# Patient Record
Sex: MALE | Race: WHITE | URBAN - METROPOLITAN AREA
[De-identification: names, ages, dates, MRNs, and addresses within clinical notes are randomized per-mention and may not be internally consistent; named-entity substitution may affect disease eponyms.]

---

## 2017-05-19 NOTE — NUR
[facility]. ETA 20-30 MINS.

## 2017-06-25 ENCOUNTER — EMERGENCY (EMERGENCY)
Facility: HOSPITAL | Age: 42
LOS: 1 days | Discharge: HOME | End: 2017-06-25
Admitting: EMERGENCY MEDICINE
Payer: SELF-PAY

## 2017-06-25 VITALS
HEART RATE: 94 BPM | TEMPERATURE: 99 F | OXYGEN SATURATION: 99 % | WEIGHT: 283.73 LBS | DIASTOLIC BLOOD PRESSURE: 81 MMHG | RESPIRATION RATE: 20 BRPM | SYSTOLIC BLOOD PRESSURE: 128 MMHG

## 2017-06-25 DIAGNOSIS — R44.0 AUDITORY HALLUCINATIONS: ICD-10-CM

## 2017-06-25 DIAGNOSIS — F20.9 SCHIZOPHRENIA, UNSPECIFIED: ICD-10-CM

## 2017-06-25 PROCEDURE — 99283 EMERGENCY DEPT VISIT LOW MDM: CPT

## 2017-06-25 RX ORDER — OLANZAPINE 15 MG/1
10 TABLET, FILM COATED ORAL ONCE
Qty: 0 | Refills: 0 | Status: COMPLETED | OUTPATIENT
Start: 2017-06-25 | End: 2017-06-25

## 2017-06-25 RX ADMIN — OLANZAPINE 10 MILLIGRAM(S): 15 TABLET, FILM COATED ORAL at 14:19

## 2017-06-25 NOTE — ED ADULT NURSE NOTE - CHPI ED SYMPTOMS NEG
no paranoia/no weight loss/no confusion/no homicidal/no disorientation/no suicidal/no agitation/no change in level of consciousness/no weakness

## 2017-06-25 NOTE — ED ADULT NURSE NOTE - CHIEF COMPLAINT QUOTE
Pt complaining of auditory hallucinations. Pt states he has been hearing voices for a while now. Pt last dose of psych meds were yesterday. Pt was admitted for same complaint one month ago at [facility]. Pt states voices are swearing at him.

## 2017-06-25 NOTE — ED ADULT TRIAGE NOTE - CHIEF COMPLAINT QUOTE
Pt complaining of auditory hallucinations. Pt states he has been hearing voices for a while now. Pt last dose of psych meds were yesterday. Pt was admitted for same complaint one month at [facility] Pt complaining of auditory hallucinations. Pt states he has been hearing voices for a while now. Pt last dose of psych meds were yesterday. Pt was admitted for same complaint one month ago at [facility]. Pt states voices are swearing at him.

## 2017-06-25 NOTE — ED PROVIDER NOTE - PROGRESS NOTE DETAILS
The scribe's documentation has been prepared under my direction and personally reviewed by me in its entirety. I confirm that the note above accurately reflects all work, treatment, procedures, and medical decision making performed by me.  JAMIE Hernandez Pt is feeling better and is asking to leave. Denies any SI or HI. Advising outpatient mental health F/U and will provide Pt with resources.

## 2017-06-25 NOTE — ED PROVIDER NOTE - MEDICAL DECISION MAKING DETAILS
Dehydration v muscle spasm v anxiety. Denies CP and SOB. Will obtain basic labs, EKG, give IV fluids, check CK, and reassess. pt reports feels better, given PO zyprexa. out pt f/u.  no risk  for si/hi at this time. well appearing

## 2017-06-25 NOTE — ED PROVIDER NOTE - OBJECTIVE STATEMENT
41y M Pt with PMHx of depression and schizophrenia, takes Atarax 50mg daily, Wellbutrin, Seroquel 600mg once daily, and Klonopin 1mg, presents to ED with auditory hallucinations and panic attacks. Pt states he ran out of his medications 1 month ago and has not followed up with any doctor. Was admitted to psych 1 month ago and was at [facility] yesterday for the same symptoms, not given or Rxed any medications. At times SI but not currently. 41y M Pt with PMHx of depression and schizophrenia, takes Atarax 50mg daily, Wellbutrin, Seroquel 600mg once daily, and Klonopin 1mg, presents to ED with auditory hallucinations and anxiety. Pt states he ran out of his medications 1 month ago and has not followed up with a doctor. Was admitted to psych 1 month ago and was at [facility] yesterday for the same symptoms as today, not given or Rxed any medications. At times SI but not currently.

## 2018-12-10 ENCOUNTER — HOSPITAL ENCOUNTER (EMERGENCY)
Dept: HOSPITAL 12 - ER | Age: 43
LOS: 1 days | Discharge: ANOTHER HOSPITAL | End: 2018-12-11
Payer: COMMERCIAL

## 2018-12-10 VITALS — HEIGHT: 71 IN | BODY MASS INDEX: 34.3 KG/M2 | WEIGHT: 245 LBS

## 2018-12-10 DIAGNOSIS — F17.290: ICD-10-CM

## 2018-12-10 DIAGNOSIS — R45.851: Primary | ICD-10-CM

## 2018-12-10 DIAGNOSIS — F20.9: ICD-10-CM

## 2018-12-10 LAB
ALP SERPL-CCNC: 56 U/L (ref 50–136)
ALT SERPL W/O P-5'-P-CCNC: 21 U/L (ref 16–63)
AMPHETAMINES UR QL SCN>1000 NG/ML: NEGATIVE
AST SERPL-CCNC: 18 U/L (ref 15–37)
BARBITURATES UR QL SCN: NEGATIVE
BASOPHILS # BLD AUTO: 0.1 K/UL (ref 0–8)
BASOPHILS NFR BLD AUTO: 0.6 % (ref 0–2)
BILIRUB DIRECT SERPL-MCNC: 0.1 MG/DL (ref 0–0.2)
BILIRUB SERPL-MCNC: 0.4 MG/DL (ref 0.2–1)
BUN SERPL-MCNC: 11 MG/DL (ref 7–18)
CHLORIDE SERPL-SCNC: 105 MMOL/L (ref 98–107)
CO2 SERPL-SCNC: 27 MMOL/L (ref 21–32)
COCAINE UR QL SCN: NEGATIVE
CREAT SERPL-MCNC: 0.8 MG/DL (ref 0.6–1.3)
EOSINOPHIL # BLD AUTO: 0.3 K/UL (ref 0–0.7)
EOSINOPHIL NFR BLD AUTO: 2.5 % (ref 0–7)
ETHANOL SERPL-MCNC: < 3 MG/DL (ref 0–0)
GLUCOSE SERPL-MCNC: 104 MG/DL (ref 74–106)
HCT VFR BLD AUTO: 40.5 % (ref 36.7–47.1)
HGB BLD-MCNC: 13.7 G/DL (ref 12.5–16.3)
LYMPHOCYTES # BLD AUTO: 1.8 K/UL (ref 20–40)
LYMPHOCYTES NFR BLD AUTO: 17.9 % (ref 20.5–51.5)
MCH RBC QN AUTO: 30.6 UUG (ref 23.8–33.4)
MCHC RBC AUTO-ENTMCNC: 34 G/DL (ref 32.5–36.3)
MCV RBC AUTO: 90.7 FL (ref 73–96.2)
MONOCYTES # BLD AUTO: 0.8 K/UL (ref 2–10)
MONOCYTES NFR BLD AUTO: 7.4 % (ref 0–11)
NEUTROPHILS # BLD AUTO: 7.3 K/UL (ref 1.8–8.9)
NEUTROPHILS NFR BLD AUTO: 71.6 % (ref 38.5–71.5)
OPIATES UR QL SCN: NEGATIVE
PCP UR QL SCN>25 NG/ML: NEGATIVE
PLATELET # BLD AUTO: 163 K/UL (ref 152–348)
POTASSIUM SERPL-SCNC: 3.5 MMOL/L (ref 3.5–5.1)
RBC # BLD AUTO: 4.47 MIL/UL (ref 4.06–5.63)
THC UR QL SCN>50 NG/ML: NEGATIVE
WBC # BLD AUTO: 10.2 K/UL (ref 3.6–10.2)
WS STN SPEC: 7 G/DL (ref 6.4–8.2)

## 2018-12-10 PROCEDURE — 85025 COMPLETE CBC W/AUTO DIFF WBC: CPT

## 2018-12-10 PROCEDURE — 99406 BEHAV CHNG SMOKING 3-10 MIN: CPT

## 2018-12-10 PROCEDURE — 36415 COLL VENOUS BLD VENIPUNCTURE: CPT

## 2018-12-10 PROCEDURE — 80048 BASIC METABOLIC PNL TOTAL CA: CPT

## 2018-12-10 PROCEDURE — 99285 EMERGENCY DEPT VISIT HI MDM: CPT

## 2018-12-10 PROCEDURE — G0480 DRUG TEST DEF 1-7 CLASSES: HCPCS

## 2018-12-10 PROCEDURE — 81001 URINALYSIS AUTO W/SCOPE: CPT

## 2018-12-10 PROCEDURE — A4663 DIALYSIS BLOOD PRESSURE CUFF: HCPCS

## 2018-12-10 PROCEDURE — 80307 DRUG TEST PRSMV CHEM ANLYZR: CPT

## 2018-12-10 PROCEDURE — 80076 HEPATIC FUNCTION PANEL: CPT

## 2018-12-10 NOTE — NUR
RECEIVED SHIFT REPORT FROM MAIRZOL DWYER. 



RECEIVED PT SLEEPING IN BED. 1:1 SITTER (YAJAIRA LINDSAY LVN) AT BEDSIDE FOR SI 
PRECAUTION.

## 2018-12-10 NOTE — NUR
Patient is resting comfortably on gurney with eyes closed, still for urine 
sample.  Patient was reminded to give urine sample.  'I don't feel like going." 
per patient's verbalization.

## 2018-12-10 NOTE — NUR
Patient is alert, requesting for juice and sandwich immediately from the ER 
staff, respiration is easy, poor hygiene noted with strong body scent/odor, 
calm & cooperative@the moment, pending MD evaluation

## 2018-12-10 NOTE — NUR
PT REFUSING V/S CHECK AT THIS TIME. PT REMAINS IN BED, DOES NOT APPEAR TO BE IN 
ANY APPARENT DISTRESS.

## 2018-12-11 LAB
APPEARANCE UR: CLEAR
BILIRUB UR QL STRIP: NEGATIVE
COLOR UR: YELLOW
DEPRECATED SQUAMOUS URNS QL MICRO: (no result) /HPF
GLUCOSE UR STRIP-MCNC: NEGATIVE MG/DL
HGB UR QL STRIP: NEGATIVE
KETONES UR STRIP-MCNC: NEGATIVE MG/DL
LEUKOCYTE ESTERASE UR QL STRIP: NEGATIVE
MUCOUS THREADS URNS QL MICRO: (no result) /LPF
NITRITE UR QL STRIP: NEGATIVE
PH UR STRIP: 6.5 [PH] (ref 5–8)
PROT UR QL STRIP: NEGATIVE
RBC #/AREA URNS HPF: (no result) /HPF (ref 0–3)
SP GR UR STRIP: 1.02 (ref 1–1.03)
UROBILINOGEN UR STRIP-MCNC: 1 E.U./DL
WBC #/AREA URNS HPF: (no result) /HPF
WBC #/AREA URNS HPF: (no result) /HPF (ref 0–3)

## 2018-12-11 NOTE — NUR
SPOKE W/ SHAHID FROM [facility] [place] INTAKE RE PT'S 
ADMISSION. ALL NECESSARY DOCUMENTS HAVE BEEN FAXED OVER. AWAITING TO HEAR BACK.

## 2018-12-11 NOTE — NUR
Ron Llanes from [facility] called to inform that Patient will be 
accepted. Accepting MD is DR Lozoya. Called for report # [phone]

## 2018-12-11 NOTE — NUR
GAVE ADMITTING REPORT TO ELSA RODRIGUEZ, AT [facility].

## 2018-12-11 NOTE — NUR
PT WILL BE TRANSFERRED TO [facility] BY EMT'S 
FROM [facility].

## 2018-12-11 NOTE — NUR
Called Kirti to transport patient to [facility]. ETA 
30mins. Trip # [id number]

## 2018-12-11 NOTE — NUR
Patient Tranfers to outside Facility



Physician: DR. KC



Location: [facility].

## 2019-01-19 ENCOUNTER — HOSPITAL ENCOUNTER (EMERGENCY)
Dept: HOSPITAL 87 - ER | Age: 44
Discharge: PSYCHIATRIC OR SUBSTANCE USE FACILITY | End: 2019-01-19
Payer: MEDICAID

## 2019-01-19 VITALS — HEIGHT: 67 IN | BODY MASS INDEX: 48.1 KG/M2 | WEIGHT: 306.44 LBS

## 2019-01-19 VITALS — SYSTOLIC BLOOD PRESSURE: 130 MMHG | DIASTOLIC BLOOD PRESSURE: 80 MMHG

## 2019-01-19 DIAGNOSIS — F20.9: ICD-10-CM

## 2019-01-19 DIAGNOSIS — Z91.14: ICD-10-CM

## 2019-01-19 DIAGNOSIS — R45.851: Primary | ICD-10-CM

## 2019-01-19 LAB
AMPHETAMINES UR QL SCN: (no result)
APPEARANCE UR: CLEAR
BARBITURATES UR QL SCN: NEGATIVE
BASOPHILS NFR BLD AUTO: 0.5 % (ref 0–2)
BENZODIAZ UR QL SCN: NEGATIVE
BZE UR QL SCN: NEGATIVE
CANNABINOIDS UR QL SCN: NEGATIVE
CHLORIDE SERPL-SCNC: 108 MEQ/L (ref 98–107)
COLOR UR: YELLOW
EOSINOPHIL NFR BLD AUTO: 2.8 % (ref 0–5)
ERYTHROCYTE [DISTWIDTH] IN BLOOD BY AUTOMATED COUNT: 14 % (ref 11.6–14.6)
ETHANOL SERPL-MCNC: < 10 MG/DL
HCT VFR BLD AUTO: 40.3 % (ref 42–52)
HGB BLD-MCNC: 13.5 G/DL (ref 14–18)
HGB UR QL STRIP: NEGATIVE
KETONES UR STRIP-MCNC: (no result) MG/DL
LEUKOCYTE ESTERASE UR QL STRIP: NEGATIVE
LYMPHOCYTES NFR BLD AUTO: 20.2 % (ref 20–50)
MCH RBC QN AUTO: 30.4 PG (ref 28–32)
MCV RBC AUTO: 91 FL (ref 80–94)
METHADONE UR QL SCN: NEGATIVE
MONOCYTES NFR BLD AUTO: 7 % (ref 2–8)
NEUTROPHILS NFR BLD AUTO: 69.5 % (ref 40–76)
NITRITE UR QL STRIP: NEGATIVE
OPIATES UR QL SCN: NEGATIVE
PCP UR QL SCN: NEGATIVE
PH UR STRIP: 5.5 [PH] (ref 4.5–8)
PLATELET # BLD AUTO: 177 X1000/UL (ref 130–400)
PMV BLD AUTO: 9.2 FL (ref 7.4–10.4)
PROT UR QL STRIP: NEGATIVE
RBC # BLD AUTO: 4.43 MILL/UL (ref 4.7–6.1)
SP GR UR STRIP: 1.02 (ref 1–1.03)
UROBILINOGEN UR STRIP-MCNC: 0.2 E.U./DL (ref 0.2–1)

## 2019-01-19 PROCEDURE — 36415 COLL VENOUS BLD VENIPUNCTURE: CPT

## 2019-01-19 PROCEDURE — 81003 URINALYSIS AUTO W/O SCOPE: CPT

## 2019-01-19 PROCEDURE — 80305 DRUG TEST PRSMV DIR OPT OBS: CPT

## 2019-01-19 PROCEDURE — 99285 EMERGENCY DEPT VISIT HI MDM: CPT

## 2019-01-19 PROCEDURE — 85025 COMPLETE CBC W/AUTO DIFF WBC: CPT

## 2019-01-19 PROCEDURE — 80053 COMPREHEN METABOLIC PANEL: CPT

## 2020-01-16 ENCOUNTER — HOSPITAL ENCOUNTER (EMERGENCY)
Dept: HOSPITAL 4 - SED | Age: 45
Discharge: HOME | End: 2020-01-16
Payer: COMMERCIAL

## 2020-01-16 VITALS — HEIGHT: 72 IN | WEIGHT: 260 LBS | BODY MASS INDEX: 35.21 KG/M2

## 2020-01-16 VITALS — SYSTOLIC BLOOD PRESSURE: 160 MMHG

## 2020-01-16 VITALS — SYSTOLIC BLOOD PRESSURE: 136 MMHG

## 2020-01-16 DIAGNOSIS — F17.290: ICD-10-CM

## 2020-01-16 DIAGNOSIS — R45.851: Primary | ICD-10-CM

## 2020-01-16 DIAGNOSIS — F20.9: ICD-10-CM

## 2020-01-16 LAB
ALBUMIN SERPL BCP-MCNC: 3.1 G/DL (ref 3.4–4.8)
ALT SERPL W P-5'-P-CCNC: 21 U/L (ref 12–78)
AMPHETAMINES UR QL SCN: NEGATIVE
ANION GAP SERPL CALCULATED.3IONS-SCNC: 7 MMOL/L (ref 5–15)
APAP SERPL-MCNC: < 1 UG/ML (ref 1–30)
APPEARANCE UR: (no result)
AST SERPL W P-5'-P-CCNC: 20 U/L (ref 10–37)
BACTERIA URNS QL MICRO: (no result) /HPF
BARBITURATES UR QL SCN: NEGATIVE
BASOPHILS # BLD AUTO: 0.1 K/UL (ref 0–0.2)
BASOPHILS NFR BLD AUTO: 0.7 % (ref 0–2)
BENZODIAZ UR QL SCN: NEGATIVE
BILIRUB SERPL-MCNC: 0.2 MG/DL (ref 0–1)
BILIRUB UR QL STRIP: NEGATIVE
BUN SERPL-MCNC: 13 MG/DL (ref 8–21)
BZE UR QL SCN: NEGATIVE
CALCIUM SERPL-MCNC: 8.1 MG/DL (ref 8.4–11)
CANNABINOIDS UR QL SCN: NEGATIVE
CHLORIDE SERPL-SCNC: 103 MMOL/L (ref 98–107)
COLOR UR: YELLOW
CREAT SERPL-MCNC: 0.75 MG/DL (ref 0.55–1.3)
EOSINOPHIL # BLD AUTO: 0.4 K/UL (ref 0–0.4)
EOSINOPHIL NFR BLD AUTO: 5 % (ref 0–4)
ERYTHROCYTE [DISTWIDTH] IN BLOOD BY AUTOMATED COUNT: 15.6 % (ref 9–15)
ETHANOL SERPL-MCNC: < 3 MG/DL (ref ?–10)
GFR SERPL CREATININE-BSD FRML MDRD: 144 ML/MIN (ref 90–?)
GLUCOSE SERPL-MCNC: 123 MG/DL (ref 70–99)
GLUCOSE UR STRIP-MCNC: NEGATIVE MG/DL
HCT VFR BLD AUTO: 37.9 % (ref 36–54)
HGB BLD-MCNC: 12.5 G/DL (ref 14–18)
HGB UR QL STRIP: NEGATIVE
KETONES UR STRIP-MCNC: NEGATIVE MG/DL
LEUKOCYTE ESTERASE UR QL STRIP: NEGATIVE
LYMPHOCYTES # BLD AUTO: 1.7 K/UL (ref 1–5.5)
LYMPHOCYTES NFR BLD AUTO: 18.9 % (ref 20.5–51.5)
MCH RBC QN AUTO: 29 PG (ref 27–31)
MCHC RBC AUTO-ENTMCNC: 33 % (ref 32–36)
MCV RBC AUTO: 86 FL (ref 79–98)
METHADONE UR-SCNC: NEGATIVE UMOL/L
METHAMPHET UR-SCNC: NEGATIVE UMOL/L
MONOCYTES # BLD MANUAL: 0.5 K/UL (ref 0–1)
MONOCYTES # BLD MANUAL: 5.9 % (ref 1.7–9.3)
NEUTROPHILS # BLD AUTO: 6.2 K/UL (ref 1.8–7.7)
NEUTROPHILS NFR BLD AUTO: 69.5 % (ref 40–70)
NITRITE UR QL STRIP: NEGATIVE
OPIATES UR QL SCN: NEGATIVE
OXYCODONE SERPL-MCNC: NEGATIVE NG/ML
PCP UR QL SCN: NEGATIVE
PH UR STRIP: 6 [PH] (ref 5–8)
PLATELET # BLD AUTO: 131 K/UL (ref 130–430)
POTASSIUM SERPL-SCNC: 3.4 MMOL/L (ref 3.5–5.1)
PROT UR QL STRIP: (no result)
RBC # BLD AUTO: 4.38 MIL/UL (ref 4.2–6.2)
RBC #/AREA URNS HPF: (no result) /HPF (ref 0–3)
SODIUM SERPLBLD-SCNC: 137 MMOL/L (ref 136–145)
SP GR UR STRIP: 1.02 (ref 1–1.03)
TRICYCLICS UR-MCNC: NEGATIVE NG/ML
URINE PROPOXYPHENE SCREEN: NEGATIVE
UROBILINOGEN UR STRIP-MCNC: 0.2 MG/DL (ref 0.2–1)
WBC # BLD AUTO: 9 K/UL (ref 4.8–10.8)
WBC #/AREA URNS HPF: (no result) /HPF (ref 0–3)

## 2020-01-16 PROCEDURE — 80053 COMPREHEN METABOLIC PANEL: CPT

## 2020-01-16 PROCEDURE — G0480 DRUG TEST DEF 1-7 CLASSES: HCPCS

## 2020-01-16 PROCEDURE — 99284 EMERGENCY DEPT VISIT MOD MDM: CPT

## 2020-01-16 PROCEDURE — 36415 COLL VENOUS BLD VENIPUNCTURE: CPT

## 2020-01-16 PROCEDURE — 81000 URINALYSIS NONAUTO W/SCOPE: CPT

## 2020-01-16 PROCEDURE — G0482 DRUG TEST DEF 15-21 CLASSES: HCPCS

## 2020-01-16 PROCEDURE — 85025 COMPLETE CBC W/AUTO DIFF WBC: CPT

## 2020-01-16 PROCEDURE — 80307 DRUG TEST PRSMV CHEM ANLYZR: CPT

## 2020-01-16 NOTE — NUR
Pt in bed continuining to sleep. No s/s of distress noted. Requested another 
blanket. Will continue to monitor.

## 2020-01-16 NOTE — NUR
Transportation arranged by house sup. Pt was provided w/ a meal and home less 
packet. Pt has adequate clothing.

## 2020-01-16 NOTE — NUR
Spoke w/ Freddy from [facility] regarding pyschiatrist follow up report. Made 
aware of situation, and report will be faxed over now. 2nd request for 
Tele[facility] has been cancelled.

## 2020-01-16 NOTE — NUR
Pt BIBA w/ statements of wanting to kill himself. Pt is alert, but confused. Pt 
refusing to answer questions. Unccoperative w/ assessment. Pt has Hx of 
schizophrenia noted. Will continue to monitor.

## 2020-01-16 NOTE — NUR
Dr. Salazar spoke to Pt via [facility]. Evaluation done. Pt able to answer 
questions asked. Reccomendations noted, will report to ER MD. Pt in bed, 
stable, able to make needs known.

## 2020-01-16 NOTE — NUR
Request for Tele[facility]. Awaiting time is approximated up to 2HRs. Will continue 
to monitor. Pt in bed asleep. No s/s of distress noted.

## 2020-01-16 NOTE — NUR
Patient given written and verbal discharge instructions and verbalizes 
understanding.  ER MD discussed with patient the results and treatment 
provided. Patient in stable condition. ID arm band removed. 

Rx of zyprexa given. Patient educated on pain management and to follow up with 
PMD. Pain Scale 0/10.

Opportunity for questions provided and answered. Medication side effect fact 
sheet provided.

## 2020-11-07 ENCOUNTER — HOSPITAL ENCOUNTER (EMERGENCY)
Dept: HOSPITAL 87 - ER | Age: 45
Discharge: HOME | End: 2020-11-07
Payer: MEDICAID

## 2020-11-07 VITALS — WEIGHT: 315 LBS | BODY MASS INDEX: 45.1 KG/M2 | HEIGHT: 70 IN

## 2020-11-07 VITALS — DIASTOLIC BLOOD PRESSURE: 80 MMHG | SYSTOLIC BLOOD PRESSURE: 148 MMHG

## 2020-11-07 DIAGNOSIS — Z59.0: ICD-10-CM

## 2020-11-07 DIAGNOSIS — F20.9: ICD-10-CM

## 2020-11-07 DIAGNOSIS — R03.0: ICD-10-CM
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
## 2020-11-07 DIAGNOSIS — T73.0XXA: Primary | ICD-10-CM

## 2020-11-07 PROCEDURE — 99283 EMERGENCY DEPT VISIT LOW MDM: CPT

## 2020-11-07 PROCEDURE — 93005 ELECTROCARDIOGRAM TRACING: CPT

## 2020-11-07 SDOH — ECONOMIC STABILITY - HOUSING INSECURITY: HOMELESSNESS: Z59.0

## 2021-03-12 ENCOUNTER — HOSPITAL ENCOUNTER (EMERGENCY)
Dept: HOSPITAL 54 - ER | Age: 46
Discharge: PSYCHIATRIC OR SUBSTANCE USE FACILITY | End: 2021-03-12
Payer: MEDICAID

## 2021-03-12 VITALS — DIASTOLIC BLOOD PRESSURE: 83 MMHG | SYSTOLIC BLOOD PRESSURE: 138 MMHG

## 2021-03-12 VITALS — WEIGHT: 315 LBS | BODY MASS INDEX: 44.1 KG/M2 | HEIGHT: 71 IN

## 2021-03-12 DIAGNOSIS — Z59.0: ICD-10-CM

## 2021-03-12 DIAGNOSIS — R45.851: Primary | ICD-10-CM

## 2021-03-12 DIAGNOSIS — F32.9: ICD-10-CM

## 2021-03-12 DIAGNOSIS — I10: ICD-10-CM

## 2021-03-12 DIAGNOSIS — F20.9: ICD-10-CM

## 2021-03-12 LAB
ALBUMIN SERPL BCP-MCNC: 3.2 G/DL (ref 3.4–5)
ALP SERPL-CCNC: 69 U/L (ref 46–116)
ALT SERPL W P-5'-P-CCNC: 17 U/L (ref 12–78)
APAP SERPL-MCNC: 0 UG/ML (ref 10–30)
AST SERPL W P-5'-P-CCNC: 18 U/L (ref 15–37)
BASOPHILS # BLD AUTO: 0 /CMM (ref 0–0.2)
BASOPHILS NFR BLD AUTO: 0.4 % (ref 0–2)
BILIRUB DIRECT SERPL-MCNC: 0.2 MG/DL (ref 0–0.2)
BILIRUB SERPL-MCNC: 0.5 MG/DL (ref 0.2–1)
BILIRUB UR QL STRIP: NEGATIVE
BUN SERPL-MCNC: 10 MG/DL (ref 7–18)
CALCIUM SERPL-MCNC: 8.6 MG/DL (ref 8.5–10.1)
CHLORIDE SERPL-SCNC: 102 MMOL/L (ref 98–107)
CO2 SERPL-SCNC: 29 MMOL/L (ref 21–32)
COLOR UR: YELLOW
CREAT SERPL-MCNC: 1 MG/DL (ref 0.6–1.3)
EOSINOPHIL NFR BLD AUTO: 6.3 % (ref 0–6)
ETHANOL SERPL-MCNC: 4 MG/DL (ref 0–0)
GLUCOSE SERPL-MCNC: 113 MG/DL (ref 74–106)
GLUCOSE UR STRIP-MCNC: NEGATIVE MG/DL
HCT VFR BLD AUTO: 36 % (ref 39–51)
HGB BLD-MCNC: 11.6 G/DL (ref 13.5–17.5)
LEUKOCYTE ESTERASE UR QL STRIP: NEGATIVE
LYMPHOCYTES NFR BLD AUTO: 1.1 /CMM (ref 0.8–4.8)
LYMPHOCYTES NFR BLD AUTO: 10.4 % (ref 20–44)
MCHC RBC AUTO-ENTMCNC: 33 G/DL (ref 31–36)
MCV RBC AUTO: 85 FL (ref 80–96)
MONOCYTES NFR BLD AUTO: 0.9 /CMM (ref 0.1–1.3)
MONOCYTES NFR BLD AUTO: 8.6 % (ref 2–12)
NEUTROPHILS # BLD AUTO: 8 /CMM (ref 1.8–8.9)
NEUTROPHILS NFR BLD AUTO: 74.3 % (ref 43–81)
NITRITE UR QL STRIP: NEGATIVE
PH UR STRIP: 6 [PH] (ref 5–8)
PLATELET # BLD AUTO: 199 /CMM (ref 150–450)
POTASSIUM SERPL-SCNC: 4.1 MMOL/L (ref 3.5–5.1)
PROT SERPL-MCNC: 7.5 G/DL (ref 6.4–8.2)
PROT UR QL STRIP: NEGATIVE MG/DL
RBC # BLD AUTO: 4.22 MIL/UL (ref 4.5–6)
RBC #/AREA URNS HPF: (no result) /HPF (ref 0–2)
SODIUM SERPL-SCNC: 140 MMOL/L (ref 136–145)
UROBILINOGEN UR STRIP-MCNC: 0.2 EU/DL
WBC #/AREA URNS HPF: (no result) /HPF (ref 0–3)
WBC NRBC COR # BLD AUTO: 10.7 K/UL (ref 4.3–11)

## 2021-03-12 PROCEDURE — 99285 EMERGENCY DEPT VISIT HI MDM: CPT

## 2021-03-12 PROCEDURE — 80320 DRUG SCREEN QUANTALCOHOLS: CPT

## 2021-03-12 PROCEDURE — C9803 HOPD COVID-19 SPEC COLLECT: HCPCS

## 2021-03-12 PROCEDURE — 36415 COLL VENOUS BLD VENIPUNCTURE: CPT

## 2021-03-12 PROCEDURE — 80299 QUANTITATIVE ASSAY DRUG: CPT

## 2021-03-12 PROCEDURE — 80307 DRUG TEST PRSMV CHEM ANLYZR: CPT

## 2021-03-12 PROCEDURE — 80076 HEPATIC FUNCTION PANEL: CPT

## 2021-03-12 PROCEDURE — G0480 DRUG TEST DEF 1-7 CLASSES: HCPCS

## 2021-03-12 PROCEDURE — 80048 BASIC METABOLIC PNL TOTAL CA: CPT

## 2021-03-12 PROCEDURE — 81001 URINALYSIS AUTO W/SCOPE: CPT

## 2021-03-12 PROCEDURE — 85025 COMPLETE CBC W/AUTO DIFF WBC: CPT

## 2021-03-12 PROCEDURE — 87426 SARSCOV CORONAVIRUS AG IA: CPT

## 2021-03-12 SDOH — ECONOMIC STABILITY - HOUSING INSECURITY: HOMELESSNESS: Z59.0

## 2021-03-12 NOTE — NUR
Consult: 



 consult requested for suicidal ideation and homelessness. Patient is a 45 
year old, white male.  SW met with the patient who is currently in the emergency department. 
SW met with the patient to assess patients needs.  Patient was alert and oriented x3. 
Patient presented disheveled and malodorous.



Patient stated that he has been having groin pain and stated that he has trouble 
ambulating due to the pain. Patient stated that he has been feeling depressed and suicidal. 
SW discussed patients mental illness history and the patient stated he is Schizophrenic and 
has auditory and visual hallucinations. Patient denied any current hallucinations.  Patient 
stated he has not been taking any psychotropic medication for his mental illness. Patient 
denied any history of substance use. Patient denied any thoughts of suicide and homicide. 

SW discussed social support with the patient stated that he did not have any social support. 
Patient stated that he receives social security income. SW offered homeless resources to the 
patient.



Substance Abuse resources provided included: [facility] Substance Abuse 
Self-Helpline ([facility]) [phone] 269-4704; [facility] [address] [facility]. [place] [address] 
[phone]; [facility] [address] [facility] [address] [phone]; 
[facility] [address] [facility] [id number] [phone]; [facility] [address] [phone];  [facility] [address] [facility] [address] [phone]; [facility] [address] [facility] [address] [phone]; [facility] Substance Abuse Helpline([facility] [phone]; [facility]  
[phone]; [facility] [phone] [facility]  [phone] [place]; [facility]  [phone] [phone] [place]; [facility] 
[phone] [address]; [facility]  [phone] [place]; [facility] [phone] [place]; [facility] [phone] [place]; [facility], [facility]. 
[phone] [place]; Alcoholics Anonymous (323) 753-4898-SFV; [facility] [phone]; Marijuana Anonymous (984) 432-5299-SFV; Narcotics Anonymous www.na.org; 

Year-round shelters: [place] [facility] [address] [place] [address] [phone]; 
[place] [facility] [address] [place] [address]; [place] [facility] [address] 
[facility]. [facility] [address] [phone]

Winter Shelters: 

[facility]

Provider: Sean of [place]

Address: [address] [place], [address]

# of Beds: 47

Phone Number: [phone]

Population Served: [facility] 6 | [facility]

Provider: [facility]

Address: [address], [address] 

# of Beds: 66

Phone Number: [phone]

Population Served: [facility]

 

Sh[place]o [place]

Provider: [facility]

Address: [address] [facility], [address]

# of Beds: 56

Phone Number: [phone]

Population Served: [facility]

 

[place] 

Provider: [facility]

Address: [address], [address]

# of Beds: 49

Phone Number: [phone]

Population Served: [facility] 8 | [facility]

Provider: [facility]

Address: [address] [facility], [address]

# of Beds: 37

Phone Number: [phone]

Population Served: [facility]



Hygiene: [facility]: [address] [address] [place] [phone]; [facility] 
[address] [facility] [phone]; [facility] [address], [place] [phone].

Food Resources: [place] [facility] at [facility] in [facility] [place]- [address]. 
[place]; Meet Each Need with Dignity ([facility]) [address] [facility]; [facility] [address] [facility]; [facility] 
[address] [place]. 

Mental Health resources provided: [facility] [address] [place], [place] [address] [phone]; [facility] [address] [facility] [address], 
[place], [place] [address] [phone]; [facility] 
[address] [place], [place] [address] [phone]; [facility] [address] 
[place], [place] [address] [phone]

Healthcare Clinics: [facility] [address], [address] [place]. 
[place] [phone]; [facility] [address] 
[place] [address]; [facility] [place] [address] [facility] [address] [phone]



Counseling--Outpatient



[facility]

[address], [address]

[place], [place] [address] [phone]

(Specializes in in-depth psychotherapy for emotional distress: anxiety, depression, 
interpersonal conflicts, life transitions, childhood abuse)



[facility]

[address] [place] [address] [phone]

(Assist with solving problem marital difficulties, separation & divorce, aging parents, 
death & grief, chronic & terminal illness)



[facility]

[address] [place] [address] [phone]

(Deal with loss & grief, anxiety, marital difficulties) 



Homebound/Mental Health Services

[address] [facility] [address], [address]

[place], [place] [id number] [phone]

(Provide in-home mental services to people who are incapable of leaving their homes)



Organization for Needs of the Elderly

Senior Service/Resource Center

[address] [facility].

[place], [place] [address] [phone]



Partial Hospitalization Program and Outpatient at [facility]

[address]

[place], [place] [id number] [phone]



[facility] 

[address]

[place], [place] [address] [phone]

PSYCHIATRIC OUTPATIENT SERVICES



[facility] Partial Hospitalization and Intensive Outpatient Program (Managed Care 
and [place] Only)[address] [address] [facility].

[facility] [phone]

[facility]

Partial Hospitalization and Outpatient [id number] [facility].  [address]

[place], [place] [phone]

[facility] Partial Hospitalization and Outpatient [address]

[place], [place] [phone]

[facility] [address] [facility]. [address]

[place], [place] [phone]

[facility] Partial Hospitalization and Outpatient 
[address] [place], .[phone]



Plan: Jv MCMILLAN Jr arranged for the patients transfer to [facility] ([address] [place] [address]; [phone]). Patient will be 
transferred to [facility] for voluntary intake. Patient signed 
the homeless waiver and SW placed the waiver in the patients chart.

## 2021-03-12 NOTE — NUR
BIBRA FOR C/O SI REQUESTING VOLUNTARY PSYCH ADMISSION, PT A, OX3, AMBULATORY. 
VSS. UNABLE TO PROVIDE URINE SAMPLE AT THIS TIME. PT REMAINED ON SI PRECAUTION. 
 WILL CONT TO MONITOR

## 2022-12-09 NOTE — ED ADULT NURSE NOTE - PRIMARY CARE PROVIDER
non affiliated Pt reassessed, pt feeling better at this time, vss, pt able to walk, talk and vocalized plan of action. Discussed in depth and explained to pt in depth the next steps that need to be taken including proper follow up with PCP or specialists. All incidental findings were discussed with pt as well. Pt verbalized their concerns and all questions were answered. Pt understands dispo and wants discharge. Given good instructions when to return to ED, strict return precautions and importance of f/u.